# Patient Record
Sex: MALE | Race: OTHER | HISPANIC OR LATINO
[De-identification: names, ages, dates, MRNs, and addresses within clinical notes are randomized per-mention and may not be internally consistent; named-entity substitution may affect disease eponyms.]

---

## 2017-11-17 ENCOUNTER — TRANSCRIPTION ENCOUNTER (OUTPATIENT)
Age: 22
End: 2017-11-17

## 2022-03-16 ENCOUNTER — EMERGENCY (EMERGENCY)
Facility: HOSPITAL | Age: 27
LOS: 1 days | Discharge: HOME | End: 2022-03-16
Attending: EMERGENCY MEDICINE | Admitting: EMERGENCY MEDICINE
Payer: COMMERCIAL

## 2022-03-16 VITALS
WEIGHT: 285.06 LBS | DIASTOLIC BLOOD PRESSURE: 72 MMHG | HEART RATE: 90 BPM | SYSTOLIC BLOOD PRESSURE: 127 MMHG | HEIGHT: 65 IN | OXYGEN SATURATION: 97 % | TEMPERATURE: 99 F | RESPIRATION RATE: 18 BRPM

## 2022-03-16 VITALS
TEMPERATURE: 99 F | HEART RATE: 79 BPM | OXYGEN SATURATION: 98 % | SYSTOLIC BLOOD PRESSURE: 128 MMHG | DIASTOLIC BLOOD PRESSURE: 77 MMHG | RESPIRATION RATE: 17 BRPM

## 2022-03-16 LAB
ALBUMIN SERPL ELPH-MCNC: 3.2 G/DL — LOW (ref 3.3–5)
ALP SERPL-CCNC: 81 U/L — SIGNIFICANT CHANGE UP (ref 30–120)
ALT FLD-CCNC: 46 U/L DA — SIGNIFICANT CHANGE UP (ref 10–60)
ANION GAP SERPL CALC-SCNC: 9 MMOL/L — SIGNIFICANT CHANGE UP (ref 5–17)
APPEARANCE UR: CLEAR — SIGNIFICANT CHANGE UP
AST SERPL-CCNC: 55 U/L — HIGH (ref 10–40)
BASOPHILS # BLD AUTO: 0.04 K/UL — SIGNIFICANT CHANGE UP (ref 0–0.2)
BASOPHILS NFR BLD AUTO: 0.6 % — SIGNIFICANT CHANGE UP (ref 0–2)
BILIRUB SERPL-MCNC: 0.6 MG/DL — SIGNIFICANT CHANGE UP (ref 0.2–1.2)
BILIRUB UR-MCNC: NEGATIVE — SIGNIFICANT CHANGE UP
BUN SERPL-MCNC: 13 MG/DL — SIGNIFICANT CHANGE UP (ref 7–23)
CALCIUM SERPL-MCNC: 8.7 MG/DL — SIGNIFICANT CHANGE UP (ref 8.4–10.5)
CHLORIDE SERPL-SCNC: 99 MMOL/L — SIGNIFICANT CHANGE UP (ref 96–108)
CO2 SERPL-SCNC: 25 MMOL/L — SIGNIFICANT CHANGE UP (ref 22–31)
COLOR SPEC: YELLOW — SIGNIFICANT CHANGE UP
CREAT SERPL-MCNC: 0.86 MG/DL — SIGNIFICANT CHANGE UP (ref 0.5–1.3)
DIFF PNL FLD: NEGATIVE — SIGNIFICANT CHANGE UP
EGFR: 122 ML/MIN/1.73M2 — SIGNIFICANT CHANGE UP
EOSINOPHIL # BLD AUTO: 0.01 K/UL — SIGNIFICANT CHANGE UP (ref 0–0.5)
EOSINOPHIL NFR BLD AUTO: 0.2 % — SIGNIFICANT CHANGE UP (ref 0–6)
FLUAV AG NPH QL: SIGNIFICANT CHANGE UP
FLUBV AG NPH QL: SIGNIFICANT CHANGE UP
GLUCOSE SERPL-MCNC: 105 MG/DL — HIGH (ref 70–99)
GLUCOSE UR QL: NEGATIVE MG/DL — SIGNIFICANT CHANGE UP
HCT VFR BLD CALC: 43.7 % — SIGNIFICANT CHANGE UP (ref 39–50)
HGB BLD-MCNC: 14.4 G/DL — SIGNIFICANT CHANGE UP (ref 13–17)
IMM GRANULOCYTES NFR BLD AUTO: 0.3 % — SIGNIFICANT CHANGE UP (ref 0–1.5)
KETONES UR-MCNC: NEGATIVE — SIGNIFICANT CHANGE UP
LEUKOCYTE ESTERASE UR-ACNC: NEGATIVE — SIGNIFICANT CHANGE UP
LYMPHOCYTES # BLD AUTO: 1.01 K/UL — SIGNIFICANT CHANGE UP (ref 1–3.3)
LYMPHOCYTES # BLD AUTO: 16.4 % — SIGNIFICANT CHANGE UP (ref 13–44)
MCHC RBC-ENTMCNC: 26.7 PG — LOW (ref 27–34)
MCHC RBC-ENTMCNC: 33 GM/DL — SIGNIFICANT CHANGE UP (ref 32–36)
MCV RBC AUTO: 80.9 FL — SIGNIFICANT CHANGE UP (ref 80–100)
MONOCYTES # BLD AUTO: 0.64 K/UL — SIGNIFICANT CHANGE UP (ref 0–0.9)
MONOCYTES NFR BLD AUTO: 10.4 % — SIGNIFICANT CHANGE UP (ref 2–14)
NEUTROPHILS # BLD AUTO: 4.44 K/UL — SIGNIFICANT CHANGE UP (ref 1.8–7.4)
NEUTROPHILS NFR BLD AUTO: 72.1 % — SIGNIFICANT CHANGE UP (ref 43–77)
NITRITE UR-MCNC: NEGATIVE — SIGNIFICANT CHANGE UP
NRBC # BLD: 0 /100 WBCS — SIGNIFICANT CHANGE UP (ref 0–0)
PH UR: 6 — SIGNIFICANT CHANGE UP (ref 5–8)
PLATELET # BLD AUTO: 227 K/UL — SIGNIFICANT CHANGE UP (ref 150–400)
POTASSIUM SERPL-MCNC: 5.1 MMOL/L — SIGNIFICANT CHANGE UP (ref 3.5–5.3)
POTASSIUM SERPL-SCNC: 5.1 MMOL/L — SIGNIFICANT CHANGE UP (ref 3.5–5.3)
PROT SERPL-MCNC: 7.8 G/DL — SIGNIFICANT CHANGE UP (ref 6–8.3)
PROT UR-MCNC: 15 MG/DL
RBC # BLD: 5.4 M/UL — SIGNIFICANT CHANGE UP (ref 4.2–5.8)
RBC # FLD: 12.3 % — SIGNIFICANT CHANGE UP (ref 10.3–14.5)
RSV RNA NPH QL NAA+NON-PROBE: SIGNIFICANT CHANGE UP
SARS-COV-2 RNA SPEC QL NAA+PROBE: SIGNIFICANT CHANGE UP
SODIUM SERPL-SCNC: 133 MMOL/L — LOW (ref 135–145)
SP GR SPEC: 1.01 — SIGNIFICANT CHANGE UP (ref 1.01–1.02)
UROBILINOGEN FLD QL: 4 MG/DL
WBC # BLD: 6.16 K/UL — SIGNIFICANT CHANGE UP (ref 3.8–10.5)
WBC # FLD AUTO: 6.16 K/UL — SIGNIFICANT CHANGE UP (ref 3.8–10.5)

## 2022-03-16 PROCEDURE — 36415 COLL VENOUS BLD VENIPUNCTURE: CPT

## 2022-03-16 PROCEDURE — 87637 SARSCOV2&INF A&B&RSV AMP PRB: CPT

## 2022-03-16 PROCEDURE — 96375 TX/PRO/DX INJ NEW DRUG ADDON: CPT

## 2022-03-16 PROCEDURE — 80053 COMPREHEN METABOLIC PANEL: CPT

## 2022-03-16 PROCEDURE — 70450 CT HEAD/BRAIN W/O DYE: CPT | Mod: MA

## 2022-03-16 PROCEDURE — 96365 THER/PROPH/DIAG IV INF INIT: CPT

## 2022-03-16 PROCEDURE — 99285 EMERGENCY DEPT VISIT HI MDM: CPT | Mod: 25

## 2022-03-16 PROCEDURE — 85025 COMPLETE CBC W/AUTO DIFF WBC: CPT

## 2022-03-16 PROCEDURE — 70450 CT HEAD/BRAIN W/O DYE: CPT | Mod: 26,MA

## 2022-03-16 PROCEDURE — 99285 EMERGENCY DEPT VISIT HI MDM: CPT

## 2022-03-16 PROCEDURE — 81001 URINALYSIS AUTO W/SCOPE: CPT

## 2022-03-16 RX ORDER — METOCLOPRAMIDE HCL 10 MG
10 TABLET ORAL ONCE
Refills: 0 | Status: COMPLETED | OUTPATIENT
Start: 2022-03-16 | End: 2022-03-16

## 2022-03-16 RX ORDER — KETOROLAC TROMETHAMINE 30 MG/ML
30 SYRINGE (ML) INJECTION ONCE
Refills: 0 | Status: DISCONTINUED | OUTPATIENT
Start: 2022-03-16 | End: 2022-03-16

## 2022-03-16 RX ORDER — SODIUM CHLORIDE 9 MG/ML
1000 INJECTION INTRAMUSCULAR; INTRAVENOUS; SUBCUTANEOUS ONCE
Refills: 0 | Status: COMPLETED | OUTPATIENT
Start: 2022-03-16 | End: 2022-03-16

## 2022-03-16 RX ORDER — ACETAMINOPHEN 500 MG
1000 TABLET ORAL ONCE
Refills: 0 | Status: COMPLETED | OUTPATIENT
Start: 2022-03-16 | End: 2022-03-16

## 2022-03-16 RX ADMIN — Medication 400 MILLIGRAM(S): at 15:36

## 2022-03-16 RX ADMIN — Medication 30 MILLIGRAM(S): at 16:31

## 2022-03-16 RX ADMIN — Medication 30 MILLIGRAM(S): at 16:06

## 2022-03-16 RX ADMIN — Medication 1000 MILLIGRAM(S): at 16:06

## 2022-03-16 RX ADMIN — SODIUM CHLORIDE 1000 MILLILITER(S): 9 INJECTION INTRAMUSCULAR; INTRAVENOUS; SUBCUTANEOUS at 15:36

## 2022-03-16 RX ADMIN — Medication 10 MILLIGRAM(S): at 15:36

## 2022-03-16 RX ADMIN — SODIUM CHLORIDE 1000 MILLILITER(S): 9 INJECTION INTRAMUSCULAR; INTRAVENOUS; SUBCUTANEOUS at 16:31

## 2022-03-16 NOTE — ED ADULT NURSE NOTE - NSSUBSTANCEUSE_GEN_ALL_CORE_SD
Hx of DM. Last OV/VV:  10/19/20      Last A1C:  9/8/20  13/1. No future appts listed.
Alert-The patient is alert, awake and responds to voice. The patient is oriented to time, place, and person. The triage nurse is able to obtain subjective information.
never used

## 2022-03-16 NOTE — ED PROVIDER NOTE - PATIENT PORTAL LINK FT
You can access the FollowMyHealth Patient Portal offered by [facility] by registering at the following website: [email or web address]. By joining [email or web address]’s FollowMyHealth portal, you will also be able to view your health information using other applications (apps) compatible with our system.

## 2022-03-16 NOTE — ED ADULT NURSE NOTE - OBJECTIVE STATEMENT
26 y/o male received aox4 ambulatory c/o headache x5 days associated with nausea and vomited once today. IVL inserted, bloods drawn and sent to lab. pt has hx seizures, currently on keppra at home, compliant with meds

## 2022-03-16 NOTE — ED PROVIDER NOTE - CLINICAL SUMMARY MEDICAL DECISION MAKING FREE TEXT BOX
26 y/o M with hx of seizures c/o right sided headache with associated n/v since 3/12, also c/o cold sweats, body aches and fatigue, mild non bloody diarrhea today. States that he has had similar headache in the past which usually only lasts 1-2 days. States that headache with worse with light exposure and slightly sensitive to noise exposure. Denies vision changes, dizziness, CP, SOB, abdominal pain, fever, rash, neck stiffness, seizures or other symptoms. PE: no neuro deficits, EOMI, PERRL, no nystagmus, MMM, no meningismus, abd soft and NT. A/p: 28 y/o M with hx of seizures c/o right sided headache with associated n/v since 3/12, also c/o cold sweats, body aches and fatigue, mild non bloody diarrhea today. States that he has had similar headache in the past which usually only lasts 1-2 days. States that headache with worse with light exposure and slightly sensitive to noise exposure. Denies vision changes, dizziness, CP, SOB, abdominal pain, fever, rash, neck stiffness, seizures or other symptoms. PE: no neuro deficits, EOMI, PERRL, no nystagmus, MMM, no meningismus, abd soft and NT. A/P: headache; will get ct head, labs, covid/flu swab, IVF/toradol/reglan, reassess 28 y/o M with hx of seizures c/o right sided headache with associated n/v since 3/12, also c/o cold sweats, body aches and fatigue, mild non bloody diarrhea today. States that he has had similar headache in the past which usually only lasts 1-2 days. States that headache with worse with light exposure and slightly sensitive to noise exposure. Denies vision changes, dizziness, CP, SOB, abdominal pain, fever, rash, neck stiffness, seizures or other symptoms. PE: no neuro deficits, EOMI, PERRL, no nystagmus, MMM, no meningismus, abd soft and NT. A/P: headache; possible migraines/viral syndrome; will get ct head, labs, covid/flu swab, IVF/toradol/reglan, reassess

## 2022-03-16 NOTE — ED PROVIDER NOTE - NSFOLLOWUPINSTRUCTIONS_ED_ALL_ED_FT
Follow up with your neurologist in 1-2 days for re-evaluation, ongoing care and treatment. Rest, drink plenty of fluids, take motrin 600mg every 6 hours with food as needed for pain. If having worsening of symptoms or other related symptoms, RETURN TO THE ER IMMEDIATELY.     Migraine Headache      A migraine headache is an intense, throbbing pain on one side or both sides of the head. Migraine headaches may also cause other symptoms, such as nausea, vomiting, and sensitivity to light and noise. A migraine headache can last from 4 hours to 3 days. Talk with your doctor about what things may bring on (trigger) your migraine headaches.      What are the causes?    The exact cause of this condition is not known. However, a migraine may be caused when nerves in the brain become irritated and release chemicals that cause inflammation of blood vessels. This inflammation causes pain. This condition may be triggered or caused by:  •Drinking alcohol.      •Smoking.    •Taking medicines, such as:  •Medicine used to treat chest pain (nitroglycerin).      •Birth control pills.      •Estrogen.      •Certain blood pressure medicines.        •Eating or drinking products that contain nitrates, glutamate, aspartame, or tyramine. Aged cheeses, chocolate, or caffeine may also be triggers.      •Doing physical activity.      Other things that may trigger a migraine headache include:  •Menstruation.      •Pregnancy.      •Hunger.      •Stress.      •Lack of sleep or too much sleep.      •Weather changes.      •Fatigue.        What increases the risk?    The following factors may make you more likely to experience migraine headaches:  •Being a certain age. This condition is more common in people who are 25–55 years old.      •Being female.      •Having a family history of migraine headaches.      •Being .      •Having a mental health condition, such as depression or anxiety.      •Being obese.        What are the signs or symptoms?    The main symptom of this condition is pulsating or throbbing pain. This pain may:  •Happen in any area of the head, such as on one side or both sides.      •Interfere with daily activities.      •Get worse with physical activity.      •Get worse with exposure to bright lights or loud noises.      Other symptoms may include:  •Nausea.      •Vomiting.      •Dizziness.      •General sensitivity to bright lights, loud noises, or smells.      Before you get a migraine headache, you may get warning signs (an aura). An aura may include:  •Seeing flashing lights or having blind spots.      •Seeing bright spots, halos, or zigzag lines.      •Having tunnel vision or blurred vision.      •Having numbness or a tingling feeling.      •Having trouble talking.      •Having muscle weakness.      Some people have symptoms after a migraine headache (postdromal phase), such as:  •Feeling tired.      •Difficulty concentrating.        How is this diagnosed?    A migraine headache can be diagnosed based on:  •Your symptoms.      •A physical exam.    •Tests, such as:   •CT scan or an MRI of the head. These imaging tests can help rule out other causes of headaches.      •Taking fluid from the spine (lumbar puncture) and analyzing it (cerebrospinal fluid analysis, or CSF analysis).          How is this treated?    This condition may be treated with medicines that:  •Relieve pain.      •Relieve nausea.      •Prevent migraine headaches.      Treatment for this condition may also include:  •Acupuncture.      •Lifestyle changes like avoiding foods that trigger migraine headaches.      •Biofeedback.      •Cognitive behavioral therapy.        Follow these instructions at home:    Medicines     •Take over-the-counter and prescription medicines only as told by your health care provider.    •Ask your health care provider if the medicine prescribed to you:  •Requires you to avoid driving or using heavy machinery.    •Can cause constipation. You may need to take these actions to prevent or treat constipation:  •Drink enough fluid to keep your urine pale yellow.      •Take over-the-counter or prescription medicines.      •Eat foods that are high in fiber, such as beans, whole grains, and fresh fruits and vegetables.      •Limit foods that are high in fat and processed sugars, such as fried or sweet foods.          Lifestyle     • Do not drink alcohol.      • Do not use any products that contain nicotine or tobacco, such as cigarettes, e-cigarettes, and chewing tobacco. If you need help quitting, ask your health care provider.      •Get at least 8 hours of sleep every night.      •Find ways to manage stress, such as meditation, deep breathing, or yoga.        General instructions                 •Keep a journal to find out what may trigger your migraine headaches. For example, write down:  •What you eat and drink.      •How much sleep you get.      •Any change to your diet or medicines.      •If you have a migraine headache:  •Avoid things that make your symptoms worse, such as bright lights.      •It may help to lie down in a dark, quiet room.      •Do not drive or use heavy machinery.      •Ask your health care provider what activities are safe for you while you are experiencing symptoms.        •Keep all follow-up visits as told by your health care provider. This is important.        Contact a health care provider if:    •You develop symptoms that are different or more severe than your usual migraine headache symptoms.      •You have more than 15 headache days in one month.        Get help right away if:    •Your migraine headache becomes severe.      •Your migraine headache lasts longer than 72 hours.      •You have a fever.      •You have a stiff neck.      •You have vision loss.      •Your muscles feel weak or like you cannot control them.      •You start to lose your balance often.      •You have trouble walking.      •You faint.      •You have a seizure.        Summary    •A migraine headache is an intense, throbbing pain on one side or both sides of the head. Migraines may also cause other symptoms, such as nausea, vomiting, and sensitivity to light and noise.      •This condition may be treated with medicines and lifestyle changes. You may also need to avoid certain things that trigger a migraine headache.      •Keep a journal to find out what may trigger your migraine headaches.      •Contact your health care provider if you have more than 15 headache days in a month or you develop symptoms that are different or more severe than your usual migraine headache symptoms.      This information is not intended to replace advice given to you by your health care provider. Make sure you discuss any questions you have with your health care provider.

## 2022-03-16 NOTE — ED PROVIDER NOTE - OBJECTIVE STATEMENT
26 y/o M w/ PMHx of seizure presents to ED c/o R sided headache since 3/12 associated w/ n/v. Pt also reports cold sweats, body aches and fatigue. No fever, chest pain, SOB cough, abd pain, diarrhea, focal weakens or numbness, rash, neck stiffness. Pt relates headache worse w/ bright lights. Pt reports similar headache in past but those usually last 1-2 dyas and this has lasted longer so he came to ED for evaluation. 26 y/o M w/ PMHx of seizure presents to ED c/o R sided headache since 3/12 associated w/ n/v. Pt also reports cold sweats, body aches and fatigue. No fever, chest pain, SOB cough, abd pain, diarrhea, focal weakness or numbness, rash, neck stiffness. Pt relates headache worse w/ bright lights. Pt reports similar headache in past but those usually last 1-2 dyas and this has lasted longer so he came to ED for evaluation.

## 2022-03-16 NOTE — ED PROVIDER NOTE - NS ED ATTENDING STATEMENT MOD
This was a shared visit with the JAN. I reviewed and verified the documentation and independently performed the documented:

## 2022-03-16 NOTE — ED PROVIDER NOTE - PROGRESS NOTE DETAILS
Reevaluated patient at bedside.  Patient feeling much improved. States that headache and nausea has resolved and wants to go home. Will d/c home, advised to take nsaids and f/u neurologist. Discussed the results of all diagnostic testing in ED and copies of all reports given.   An opportunity to ask questions was given.  Discussed the importance of prompt, close medical follow-up.  Patient will return with any changes, concerns or persistent / worsening symptoms.  Understanding of all instructions verbalized.
